# Patient Record
Sex: FEMALE | Race: OTHER | NOT HISPANIC OR LATINO
[De-identification: names, ages, dates, MRNs, and addresses within clinical notes are randomized per-mention and may not be internally consistent; named-entity substitution may affect disease eponyms.]

---
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
## 2022-01-12 ENCOUNTER — APPOINTMENT (OUTPATIENT)
Dept: PEDIATRIC HEMATOLOGY/ONCOLOGY | Facility: CLINIC | Age: 22
End: 2022-01-12
Payer: MEDICAID

## 2022-01-12 ENCOUNTER — LABORATORY RESULT (OUTPATIENT)
Age: 22
End: 2022-01-12

## 2022-01-12 ENCOUNTER — OUTPATIENT (OUTPATIENT)
Dept: OUTPATIENT SERVICES | Facility: HOSPITAL | Age: 22
LOS: 1 days | Discharge: HOME | End: 2022-01-12

## 2022-01-12 ENCOUNTER — APPOINTMENT (OUTPATIENT)
Dept: PEDIATRIC HEMATOLOGY/ONCOLOGY | Facility: CLINIC | Age: 22
End: 2022-01-12

## 2022-01-12 VITALS
HEIGHT: 70 IN | SYSTOLIC BLOOD PRESSURE: 121 MMHG | HEART RATE: 69 BPM | WEIGHT: 250 LBS | TEMPERATURE: 97 F | BODY MASS INDEX: 35.79 KG/M2 | DIASTOLIC BLOOD PRESSURE: 64 MMHG | OXYGEN SATURATION: 96 %

## 2022-01-12 LAB
HCT VFR BLD CALC: 40.5 %
HGB BLD-MCNC: 13.8 G/DL
MCHC RBC-ENTMCNC: 27.9 PG
MCHC RBC-ENTMCNC: 34.1 G/DL
MCV RBC AUTO: 82 FL
PLATELET # BLD AUTO: 240 K/UL
PMV BLD: 10 FL
RBC # BLD: 4.94 M/UL
RBC # FLD: 12.9 %
WBC # FLD AUTO: 7.71 K/UL

## 2022-01-12 PROCEDURE — 99214 OFFICE O/P EST MOD 30 MIN: CPT

## 2022-01-12 RX ORDER — INFLUENZA VIRUS VACCINE 15; 15; 15; 15 UG/.5ML; UG/.5ML; UG/.5ML; UG/.5ML
0.5 SUSPENSION INTRAMUSCULAR ONCE
Refills: 0 | Status: COMPLETED | OUTPATIENT
Start: 2022-01-12 | End: 2022-01-12

## 2022-01-12 RX ADMIN — INFLUENZA VIRUS VACCINE 0.5 MILLILITER(S): 15; 15; 15; 15 SUSPENSION INTRAMUSCULAR at 11:33

## 2022-01-13 LAB
ALBUMIN SERPL ELPH-MCNC: 4.9 G/DL
ALP BLD-CCNC: 63 U/L
ALT SERPL-CCNC: 19 U/L
ANION GAP SERPL CALC-SCNC: 16 MMOL/L
AST SERPL-CCNC: 19 U/L
BILIRUB SERPL-MCNC: 0.3 MG/DL
BUN SERPL-MCNC: 15 MG/DL
CALCIUM SERPL-MCNC: 10.1 MG/DL
CHLORIDE SERPL-SCNC: 103 MMOL/L
CO2 SERPL-SCNC: 22 MMOL/L
CREAT SERPL-MCNC: 0.9 MG/DL
ERYTHROCYTE [SEDIMENTATION RATE] IN BLOOD BY WESTERGREN METHOD: 30 MM/HR
GLUCOSE SERPL-MCNC: 98 MG/DL
LDH SERPL-CCNC: 167
POTASSIUM SERPL-SCNC: 4.5 MMOL/L
PROT SERPL-MCNC: 7.9 G/DL
SODIUM SERPL-SCNC: 141 MMOL/L

## 2022-01-14 NOTE — HISTORY OF PRESENT ILLNESS
[de-identified] : 20 yo with h/o  B cell lymphoma, ~4 years off tx.  \par \par No recent fevers.  Recent mild rhinorrhea, no cough.  Rapid covid test neg.\par no SOB, no chest pain, no resp distress, no orthopnea\par no recent adenopathy\par no recent boils/skin infections

## 2022-01-18 DIAGNOSIS — C85.13 UNSPECIFIED B-CELL LYMPHOMA, INTRA-ABDOMINAL LYMPH NODES: ICD-10-CM

## 2022-01-18 DIAGNOSIS — Z08 ENCOUNTER FOR FOLLOW-UP EXAMINATION AFTER COMPLETED TREATMENT FOR MALIGNANT NEOPLASM: ICD-10-CM

## 2022-01-22 ENCOUNTER — OUTPATIENT (OUTPATIENT)
Dept: OUTPATIENT SERVICES | Facility: HOSPITAL | Age: 22
LOS: 1 days | Discharge: HOME | End: 2022-01-22
Payer: MEDICAID

## 2022-01-22 DIAGNOSIS — C85.13 UNSPECIFIED B-CELL LYMPHOMA, INTRA-ABDOMINAL LYMPH NODES: ICD-10-CM

## 2022-01-22 DIAGNOSIS — Z08 ENCOUNTER FOR FOLLOW-UP EXAMINATION AFTER COMPLETED TREATMENT FOR MALIGNANT NEOPLASM: ICD-10-CM

## 2022-01-22 PROCEDURE — 71046 X-RAY EXAM CHEST 2 VIEWS: CPT | Mod: 26

## 2022-01-22 PROCEDURE — 76700 US EXAM ABDOM COMPLETE: CPT | Mod: 26

## 2022-05-16 ENCOUNTER — APPOINTMENT (OUTPATIENT)
Dept: PEDIATRIC HEMATOLOGY/ONCOLOGY | Facility: CLINIC | Age: 22
End: 2022-05-16

## 2022-08-31 ENCOUNTER — APPOINTMENT (OUTPATIENT)
Dept: PEDIATRIC HEMATOLOGY/ONCOLOGY | Facility: CLINIC | Age: 22
End: 2022-08-31

## 2022-08-31 ENCOUNTER — LABORATORY RESULT (OUTPATIENT)
Age: 22
End: 2022-08-31

## 2022-08-31 ENCOUNTER — OUTPATIENT (OUTPATIENT)
Dept: OUTPATIENT SERVICES | Facility: HOSPITAL | Age: 22
LOS: 1 days | Discharge: HOME | End: 2022-08-31

## 2022-08-31 VITALS
TEMPERATURE: 98.4 F | DIASTOLIC BLOOD PRESSURE: 56 MMHG | HEART RATE: 68 BPM | SYSTOLIC BLOOD PRESSURE: 115 MMHG | RESPIRATION RATE: 20 BRPM | WEIGHT: 239 LBS

## 2022-08-31 PROCEDURE — 99214 OFFICE O/P EST MOD 30 MIN: CPT

## 2022-09-01 NOTE — HISTORY OF PRESENT ILLNESS
[No Feeding Issues] : no feeding issues at this time [de-identified] : 20 y/o female with h/o B cell Lymphoma now off treatment 4.5 years seen in clinic today for scheduled follow up visit.  Patient reports that she has been feeling well.   Denies recent fever or infections.  Denies abdominal pain, vomiting or diarrhea.  Denies night sweats.   No significant changes in weight.  No reports of bleeding or bruising.  Eating well and with good energy level.   States that she continues to have boils to groin/thigh area that become inflamed at times.   Reports inflammation had worsened last week which lasted a few days and then symptoms had resolved; she did shave which exacerbated the lesions.\par regular menses, no heavy bleeding

## 2022-09-01 NOTE — END OF VISIT
[FreeTextEntry3] : 20 yo with h/o NHL off tx 5 years here for f/u.  Boils to inner thighs/groin.  Recommend warm compresses, can try mupirocin to boil prn, f/u with ID;   f/u clinic 1 month for flu vaccine or sooner with any concerns

## 2022-09-01 NOTE — REASON FOR VISIT
[Follow-Up Visit] : a follow-up visit for [Mother] : mother [Patient] : patient [FreeTextEntry2] : h/o B cell lymphoma

## 2022-09-01 NOTE — PHYSICAL EXAM
[Normal] : affect appropriate [de-identified] : boil noted to left inner thight- tender to touch, no drainage noted;

## 2022-09-02 LAB
ALBUMIN SERPL ELPH-MCNC: 4.8 G/DL
ALP BLD-CCNC: 58 U/L
ALT SERPL-CCNC: 8 U/L
ANION GAP SERPL CALC-SCNC: 10 MMOL/L
AST SERPL-CCNC: 10 U/L
BILIRUB SERPL-MCNC: 0.5 MG/DL
BUN SERPL-MCNC: 9 MG/DL
CALCIUM SERPL-MCNC: 9.5 MG/DL
CHLORIDE SERPL-SCNC: 104 MMOL/L
CO2 SERPL-SCNC: 26 MMOL/L
CREAT SERPL-MCNC: 0.7 MG/DL
EGFR: 128 ML/MIN/1.73M2
ERYTHROCYTE [SEDIMENTATION RATE] IN BLOOD BY WESTERGREN METHOD: 16 MM/HR
GLUCOSE SERPL-MCNC: 86 MG/DL
HCT VFR BLD CALC: 38.6 %
HGB BLD-MCNC: 12.9 G/DL
LDH SERPL-CCNC: 138
MCHC RBC-ENTMCNC: 27.6 PG
MCHC RBC-ENTMCNC: 33.4 G/DL
MCV RBC AUTO: 82.5 FL
PLATELET # BLD AUTO: 206 K/UL
PMV BLD: 10.4 FL
POTASSIUM SERPL-SCNC: 4.3 MMOL/L
PROT SERPL-MCNC: 7.1 G/DL
RBC # BLD: 4.68 M/UL
RBC # FLD: 12.8 %
SODIUM SERPL-SCNC: 140 MMOL/L
WBC # FLD AUTO: 6.14 K/UL

## 2022-09-06 DIAGNOSIS — L02.93 CARBUNCLE, UNSPECIFIED: ICD-10-CM

## 2022-09-06 DIAGNOSIS — C85.13 UNSPECIFIED B-CELL LYMPHOMA, INTRA-ABDOMINAL LYMPH NODES: ICD-10-CM

## 2022-09-06 DIAGNOSIS — Z08 ENCOUNTER FOR FOLLOW-UP EXAMINATION AFTER COMPLETED TREATMENT FOR MALIGNANT NEOPLASM: ICD-10-CM

## 2022-09-30 ENCOUNTER — APPOINTMENT (OUTPATIENT)
Dept: PEDIATRIC HEMATOLOGY/ONCOLOGY | Facility: CLINIC | Age: 22
End: 2022-09-30

## 2022-10-07 ENCOUNTER — APPOINTMENT (OUTPATIENT)
Dept: PEDIATRIC HEMATOLOGY/ONCOLOGY | Facility: CLINIC | Age: 22
End: 2022-10-07

## 2022-12-14 ENCOUNTER — APPOINTMENT (OUTPATIENT)
Dept: PEDIATRIC HEMATOLOGY/ONCOLOGY | Facility: CLINIC | Age: 22
End: 2022-12-14

## 2022-12-14 VITALS
OXYGEN SATURATION: 98 % | WEIGHT: 238 LBS | BODY MASS INDEX: 34.07 KG/M2 | TEMPERATURE: 98.6 F | DIASTOLIC BLOOD PRESSURE: 64 MMHG | HEART RATE: 63 BPM | RESPIRATION RATE: 20 BRPM | SYSTOLIC BLOOD PRESSURE: 131 MMHG | HEIGHT: 70 IN

## 2022-12-14 DIAGNOSIS — L03.90 CELLULITIS, UNSPECIFIED: ICD-10-CM

## 2022-12-14 PROCEDURE — 99214 OFFICE O/P EST MOD 30 MIN: CPT

## 2022-12-14 RX ORDER — CLINDAMYCIN HYDROCHLORIDE 300 MG/1
300 CAPSULE ORAL EVERY 6 HOURS
Qty: 40 | Refills: 0 | Status: ACTIVE | COMMUNITY
Start: 2022-12-14 | End: 1900-01-01

## 2022-12-19 ENCOUNTER — APPOINTMENT (OUTPATIENT)
Dept: PEDIATRIC HEMATOLOGY/ONCOLOGY | Facility: CLINIC | Age: 22
End: 2022-12-19
Payer: MEDICAID

## 2022-12-19 ENCOUNTER — OUTPATIENT (OUTPATIENT)
Dept: OUTPATIENT SERVICES | Facility: HOSPITAL | Age: 22
LOS: 1 days | End: 2022-12-19

## 2022-12-19 VITALS
WEIGHT: 239.42 LBS | HEART RATE: 72 BPM | SYSTOLIC BLOOD PRESSURE: 119 MMHG | RESPIRATION RATE: 20 BRPM | DIASTOLIC BLOOD PRESSURE: 66 MMHG | TEMPERATURE: 98.4 F

## 2022-12-19 DIAGNOSIS — L02.93 CARBUNCLE, UNSPECIFIED: ICD-10-CM

## 2022-12-19 DIAGNOSIS — B34.9 VIRAL INFECTION, UNSPECIFIED: ICD-10-CM

## 2022-12-19 PROCEDURE — 99213 OFFICE O/P EST LOW 20 MIN: CPT

## 2022-12-21 PROBLEM — L03.90 CELLULITIS: Status: ACTIVE | Noted: 2022-12-19

## 2022-12-22 NOTE — END OF VISIT
[FreeTextEntry3] : pt seen and examined.  cellulitis left breast. tx with clinda.  recommended f/u with ID due to h/o recurrent boils.  f/u for reassessment- call sooner if worsening symptoms.   Is This A New Presentation, Or A Follow-Up?: Skin Lesions How Severe Is Your Skin Lesion?: moderate Have Your Skin Lesions Been Treated?: not been treated

## 2022-12-22 NOTE — HISTORY OF PRESENT ILLNESS
[de-identified] : 23 y/o female with h/o B cell Lymphoma,completed treatment almost 6 years ago with c/o redness and irritation to skin of left breast x1 day.  Patient reports that she noticed irritation and redness to affected area worsen over the past 24 hours.   Denies fever, pain or drainage from site.  States that she does exercise daily, walks for long periods of time and notices sweat accumulating around affected area, thought possibly that undergarments may have irritated area.

## 2022-12-22 NOTE — REASON FOR VISIT
[Follow-Up Visit] : a follow-up visit for [Mother] : mother [Patient] : patient [FreeTextEntry2] : B cell Lymphoma

## 2022-12-22 NOTE — PHYSICAL EXAM
[Normal] : full range of motion and no deformities appreciated, no masses and normal strength in all extremities [de-identified] : small indurated area ~1cm noted  left breast with erythema noted around site, soft, nontender and  no drainage noted

## 2022-12-23 DIAGNOSIS — L03.90 CELLULITIS, UNSPECIFIED: ICD-10-CM

## 2022-12-28 PROBLEM — L02.93 RECURRENT BOILS: Status: ACTIVE | Noted: 2020-09-09

## 2022-12-28 PROBLEM — B34.9 VIRAL SYNDROME: Status: ACTIVE | Noted: 2017-12-13

## 2022-12-28 NOTE — HISTORY OF PRESENT ILLNESS
[No Feeding Issues] : no feeding issues at this time [de-identified] : This is a scheduled follow up visit for this 23 y/o female with h/o B cell Lymphoma off treatment almost 6 years presents to clinic today for scheduled follow up visit.  Patient last seen in clinic on 12/14 due to cellulitis noted to left breast - patient was started on course of Clindamycin.  Reports improvement in symptoms.  Denies drainage from site.  Denies fever or pain.

## 2022-12-28 NOTE — PHYSICAL EXAM
[Normal] : full range of motion and no deformities appreciated, no masses and normal strength in all extremities [de-identified] : cellulitis to left breast - no induration, pain or drainage noted at site

## 2022-12-28 NOTE — REVIEW OF SYSTEMS
[Fever] : no fever [Fatigue] : no fatigue [Rash] : no rash [Petechiae] : no petechiae [Ecchymoses] : no ecchymoses [Nasal Discharge] : no nasal discharge [Sore Throat] : no sore throat [Pallor] : no pallor [Bleeding] : no bleeding [Bruising] : no bruising [Adenopathy] : no adenopathy [Anemia] : no anemia [Frequent Infections] : no frequent infections [Dyspnea] : no dyspnea [Cough] : no cough [Wheezing] : no wheezing [Stridor] : no stridor [Murmur] : no murmur [Chest Pain] : no chest pain [Abdominal Pain] : no abdominal pain [Nausea] : no nausea [Headache] : no headache [Yost] : not yost [Irritable] : not irritable

## 2022-12-28 NOTE — END OF VISIT
[FreeTextEntry3] : pt seen, agree with plan above. complete course of clindamycin. f/u ID for recurrent skin infections/boils.

## 2023-01-03 DIAGNOSIS — L02.93 CARBUNCLE, UNSPECIFIED: ICD-10-CM

## 2023-01-03 DIAGNOSIS — B34.9 VIRAL INFECTION, UNSPECIFIED: ICD-10-CM

## 2023-01-03 DIAGNOSIS — Z08 ENCOUNTER FOR FOLLOW-UP EXAMINATION AFTER COMPLETED TREATMENT FOR MALIGNANT NEOPLASM: ICD-10-CM

## 2023-10-30 ENCOUNTER — OUTPATIENT (OUTPATIENT)
Dept: OUTPATIENT SERVICES | Facility: HOSPITAL | Age: 23
LOS: 1 days | End: 2023-10-30
Payer: MEDICAID

## 2023-10-30 ENCOUNTER — LABORATORY RESULT (OUTPATIENT)
Age: 23
End: 2023-10-30

## 2023-10-30 ENCOUNTER — APPOINTMENT (OUTPATIENT)
Dept: PEDIATRIC HEMATOLOGY/ONCOLOGY | Facility: CLINIC | Age: 23
End: 2023-10-30
Payer: MEDICAID

## 2023-10-30 VITALS
HEART RATE: 61 BPM | DIASTOLIC BLOOD PRESSURE: 62 MMHG | TEMPERATURE: 98 F | BODY MASS INDEX: 37.43 KG/M2 | OXYGEN SATURATION: 98 % | WEIGHT: 261.47 LBS | SYSTOLIC BLOOD PRESSURE: 134 MMHG | RESPIRATION RATE: 20 BRPM | HEIGHT: 70.08 IN

## 2023-10-30 DIAGNOSIS — C85.13 UNSPECIFIED B-CELL LYMPHOMA, INTRA-ABDOMINAL LYMPH NODES: ICD-10-CM

## 2023-10-30 DIAGNOSIS — Z28.21 IMMUNIZATION NOT CARRIED OUT BECAUSE OF PATIENT REFUSAL: ICD-10-CM

## 2023-10-30 DIAGNOSIS — Z71.85 ENCOUNTER FOR IMMUNIZATION SAFETY COUNSELING: ICD-10-CM

## 2023-10-30 DIAGNOSIS — Z08 ENCOUNTER FOR FOLLOW-UP EXAMINATION AFTER COMPLETED TREATMENT FOR MALIGNANT NEOPLASM: ICD-10-CM

## 2023-10-30 PROCEDURE — 85027 COMPLETE CBC AUTOMATED: CPT

## 2023-10-30 PROCEDURE — 99214 OFFICE O/P EST MOD 30 MIN: CPT

## 2023-10-30 PROCEDURE — 80053 COMPREHEN METABOLIC PANEL: CPT

## 2023-10-30 PROCEDURE — 85652 RBC SED RATE AUTOMATED: CPT

## 2023-10-30 PROCEDURE — 83615 LACTATE (LD) (LDH) ENZYME: CPT

## 2023-10-31 DIAGNOSIS — C85.13 UNSPECIFIED B-CELL LYMPHOMA, INTRA-ABDOMINAL LYMPH NODES: ICD-10-CM

## 2023-10-31 PROBLEM — Z71.85 VACCINE COUNSELING: Status: ACTIVE | Noted: 2023-10-31

## 2023-10-31 PROBLEM — Z28.21 IMMUNIZATION REFUSED: Status: ACTIVE | Noted: 2023-10-31

## 2023-10-31 PROBLEM — Z08 ENCOUNTER FOR ROUTINE CANCER FOLLOW-UP: Status: ACTIVE | Noted: 2021-07-14

## 2023-10-31 LAB
ALBUMIN SERPL ELPH-MCNC: 4.9 G/DL
ALP BLD-CCNC: 65 U/L
ALT SERPL-CCNC: 13 U/L
ANION GAP SERPL CALC-SCNC: 12 MMOL/L
AST SERPL-CCNC: 12 U/L
BILIRUB SERPL-MCNC: 0.3 MG/DL
BUN SERPL-MCNC: 12 MG/DL
CALCIUM SERPL-MCNC: 9.5 MG/DL
CHLORIDE SERPL-SCNC: 102 MMOL/L
CO2 SERPL-SCNC: 24 MMOL/L
CREAT SERPL-MCNC: 0.8 MG/DL
EGFR: 108 ML/MIN/1.73M2
GLUCOSE SERPL-MCNC: 94 MG/DL
HCT VFR BLD CALC: 39.7 %
HGB BLD-MCNC: 13.2 G/DL
LDH SERPL-CCNC: 192
MCHC RBC-ENTMCNC: 27.5 PG
MCHC RBC-ENTMCNC: 33.2 G/DL
MCV RBC AUTO: 82.7 FL
PLATELET # BLD AUTO: 227 K/UL
PMV BLD: 10.7 FL
POTASSIUM SERPL-SCNC: 4.2 MMOL/L
PROT SERPL-MCNC: 7.6 G/DL
RBC # BLD: 4.8 M/UL
RBC # FLD: 13.5 %
SODIUM SERPL-SCNC: 138 MMOL/L
WBC # FLD AUTO: 8.31 K/UL

## 2023-11-01 LAB — ERYTHROCYTE [SEDIMENTATION RATE] IN BLOOD BY WESTERGREN METHOD: 29 MM/HR

## 2024-12-30 ENCOUNTER — LABORATORY RESULT (OUTPATIENT)
Age: 24
End: 2024-12-30

## 2024-12-30 ENCOUNTER — OUTPATIENT (OUTPATIENT)
Dept: OUTPATIENT SERVICES | Facility: HOSPITAL | Age: 24
LOS: 1 days | End: 2024-12-30
Payer: MEDICAID

## 2024-12-30 ENCOUNTER — APPOINTMENT (OUTPATIENT)
Age: 24
End: 2024-12-30
Payer: MEDICAID

## 2024-12-30 VITALS
TEMPERATURE: 98 F | RESPIRATION RATE: 18 BRPM | OXYGEN SATURATION: 97 % | DIASTOLIC BLOOD PRESSURE: 70 MMHG | HEART RATE: 83 BPM | BODY MASS INDEX: 35.05 KG/M2 | HEIGHT: 69.69 IN | WEIGHT: 242.07 LBS | SYSTOLIC BLOOD PRESSURE: 113 MMHG

## 2024-12-30 DIAGNOSIS — C85.13 UNSPECIFIED B-CELL LYMPHOMA, INTRA-ABDOMINAL LYMPH NODES: ICD-10-CM

## 2024-12-30 DIAGNOSIS — Z08 ENCOUNTER FOR FOLLOW-UP EXAMINATION AFTER COMPLETED TREATMENT FOR MALIGNANT NEOPLASM: ICD-10-CM

## 2024-12-30 DIAGNOSIS — L02.92 FURUNCLE, UNSPECIFIED: ICD-10-CM

## 2024-12-30 LAB
ALBUMIN SERPL ELPH-MCNC: 4.8 G/DL
ALP BLD-CCNC: 54 U/L
ALT SERPL-CCNC: 12 U/L
ANION GAP SERPL CALC-SCNC: 10 MMOL/L
AST SERPL-CCNC: 11 U/L
BILIRUB SERPL-MCNC: 0.4 MG/DL
BUN SERPL-MCNC: 7 MG/DL
CALCIUM SERPL-MCNC: 9.6 MG/DL
CHLORIDE SERPL-SCNC: 105 MMOL/L
CO2 SERPL-SCNC: 26 MMOL/L
CREAT SERPL-MCNC: 0.7 MG/DL
EGFR: 125 ML/MIN/1.73M2
ERYTHROCYTE [SEDIMENTATION RATE] IN BLOOD BY WESTERGREN METHOD: 14 MM/HR
GLUCOSE SERPL-MCNC: 89 MG/DL
HCT VFR BLD CALC: 36.2 %
HGB BLD-MCNC: 12.3 G/DL
LDH SERPL-CCNC: 134
MCHC RBC-ENTMCNC: 27.4 PG
MCHC RBC-ENTMCNC: 34 G/DL
MCV RBC AUTO: 80.6 FL
PLATELET # BLD AUTO: 215 K/UL
PMV BLD: 10.8 FL
POTASSIUM SERPL-SCNC: 4.2 MMOL/L
PROT SERPL-MCNC: 7.1 G/DL
RBC # BLD: 4.49 M/UL
RBC # FLD: 13.4 %
SODIUM SERPL-SCNC: 141 MMOL/L
T4 FREE SERPL-MCNC: 1.2 NG/DL
TSH SERPL-ACNC: 1.21 UIU/ML
WBC # FLD AUTO: 6.93 K/UL

## 2024-12-30 PROCEDURE — 83615 LACTATE (LD) (LDH) ENZYME: CPT

## 2024-12-30 PROCEDURE — 84443 ASSAY THYROID STIM HORMONE: CPT

## 2024-12-30 PROCEDURE — 99214 OFFICE O/P EST MOD 30 MIN: CPT

## 2024-12-30 PROCEDURE — 85027 COMPLETE CBC AUTOMATED: CPT

## 2024-12-30 PROCEDURE — 80053 COMPREHEN METABOLIC PANEL: CPT

## 2024-12-30 PROCEDURE — 82784 ASSAY IGA/IGD/IGG/IGM EACH: CPT

## 2024-12-30 PROCEDURE — 84439 ASSAY OF FREE THYROXINE: CPT

## 2024-12-30 PROCEDURE — 85652 RBC SED RATE AUTOMATED: CPT

## 2024-12-31 DIAGNOSIS — C85.13 UNSPECIFIED B-CELL LYMPHOMA, INTRA-ABDOMINAL LYMPH NODES: ICD-10-CM

## 2024-12-31 LAB
IGA SER QL IEP: 211 MG/DL
IGG SER QL IEP: 1004 MG/DL
IGM SER QL IEP: 112 MG/DL

## 2025-01-06 ENCOUNTER — NON-APPOINTMENT (OUTPATIENT)
Age: 25
End: 2025-01-06

## 2025-06-13 ENCOUNTER — APPOINTMENT (OUTPATIENT)
Age: 25
End: 2025-06-13
Payer: MEDICAID

## 2025-06-13 ENCOUNTER — OUTPATIENT (OUTPATIENT)
Dept: OUTPATIENT SERVICES | Facility: HOSPITAL | Age: 25
LOS: 1 days | End: 2025-06-13
Payer: MEDICAID

## 2025-06-13 VITALS
DIASTOLIC BLOOD PRESSURE: 69 MMHG | BODY MASS INDEX: 34.41 KG/M2 | HEIGHT: 69.69 IN | RESPIRATION RATE: 18 BRPM | OXYGEN SATURATION: 98 % | HEART RATE: 68 BPM | SYSTOLIC BLOOD PRESSURE: 112 MMHG | WEIGHT: 237.66 LBS | TEMPERATURE: 98 F

## 2025-06-13 DIAGNOSIS — C85.13 UNSPECIFIED B-CELL LYMPHOMA, INTRA-ABDOMINAL LYMPH NODES: ICD-10-CM

## 2025-06-13 LAB
ALBUMIN SERPL ELPH-MCNC: 4.6 G/DL
ALP BLD-CCNC: 62 U/L
ALT SERPL-CCNC: 9 U/L
ANION GAP SERPL CALC-SCNC: 12 MMOL/L
AST SERPL-CCNC: 12 U/L
AUTO BASOPHILS #: 0.03 K/UL
AUTO BASOPHILS %: 0.5 %
AUTO EOSINOPHILS #: 0.1 K/UL
AUTO EOSINOPHILS %: 1.5 %
AUTO IMMATURE GRANULOCYTES #: 0.01 K/UL
AUTO LYMPHOCYTES #: 2.73 K/UL
AUTO LYMPHOCYTES %: 42.2 %
AUTO MONOCYTES #: 0.6 K/UL
AUTO MONOCYTES %: 9.3 %
AUTO NEUTROPHILS #: 3 K/UL
AUTO NEUTROPHILS %: 46.3 %
AUTO NRBC #: 0 K/UL
BILIRUB SERPL-MCNC: 0.4 MG/DL
BUN SERPL-MCNC: 17 MG/DL
CALCIUM SERPL-MCNC: 9.3 MG/DL
CHLORIDE SERPL-SCNC: 105 MMOL/L
CO2 SERPL-SCNC: 22 MMOL/L
CREAT SERPL-MCNC: 0.8 MG/DL
EGFRCR SERPLBLD CKD-EPI 2021: 107 ML/MIN/1.73M2
ERYTHROCYTE [SEDIMENTATION RATE] IN BLOOD BY WESTERGREN METHOD: 10 MM/HR
GLUCOSE SERPL-MCNC: 91 MG/DL
HCT VFR BLD CALC: 36.3 %
HGB BLD-MCNC: 12 G/DL
IMM GRANULOCYTES NFR BLD AUTO: 0.2 %
IMMATURE RETICULOCYTE FRACTION %: 5.9 %
LDH SERPL-CCNC: 173
MAN DIFF?: NORMAL
MCHC RBC-ENTMCNC: 27.4 PG
MCHC RBC-ENTMCNC: 33.1 G/DL
MCV RBC AUTO: 82.9 FL
PLATELET # BLD AUTO: 204 K/UL
PMV BLD AUTO: 0 /100 WBCS
PMV BLD: 10.4 FL
POTASSIUM SERPL-SCNC: 4.6 MMOL/L
PROT SERPL-MCNC: 7.1 G/DL
RBC # BLD: 4.38 M/UL
RBC # BLD: 4.38 M/UL
RBC # FLD: 13.2 %
RETICS # AUTO: 1.5 %
RETICS AGGREG/RBC NFR: 63.9 K/UL
RETICULOCYTE HEMOGLOBIN EQUIVALENT: 30.9 PG
SODIUM SERPL-SCNC: 139 MMOL/L
WBC # FLD AUTO: 6.47 K/UL

## 2025-06-13 PROCEDURE — 99214 OFFICE O/P EST MOD 30 MIN: CPT

## 2025-06-13 PROCEDURE — 82784 ASSAY IGA/IGD/IGG/IGM EACH: CPT

## 2025-06-13 PROCEDURE — 82728 ASSAY OF FERRITIN: CPT

## 2025-06-13 PROCEDURE — 85652 RBC SED RATE AUTOMATED: CPT

## 2025-06-13 PROCEDURE — 83615 LACTATE (LD) (LDH) ENZYME: CPT

## 2025-06-13 PROCEDURE — 80053 COMPREHEN METABOLIC PANEL: CPT

## 2025-06-13 PROCEDURE — 85025 COMPLETE CBC W/AUTO DIFF WBC: CPT

## 2025-06-13 PROCEDURE — 85045 AUTOMATED RETICULOCYTE COUNT: CPT

## 2025-06-14 DIAGNOSIS — C85.13 UNSPECIFIED B-CELL LYMPHOMA, INTRA-ABDOMINAL LYMPH NODES: ICD-10-CM

## 2025-06-14 LAB
FERRITIN SERPL-MCNC: 91 NG/ML
IGG SERPL-MCNC: 951 MG/DL

## 2025-06-19 ENCOUNTER — OUTPATIENT (OUTPATIENT)
Dept: OUTPATIENT SERVICES | Facility: HOSPITAL | Age: 25
LOS: 1 days | End: 2025-06-19
Payer: MEDICAID

## 2025-06-19 ENCOUNTER — RESULT REVIEW (OUTPATIENT)
Age: 25
End: 2025-06-19

## 2025-06-19 DIAGNOSIS — Z00.8 ENCOUNTER FOR OTHER GENERAL EXAMINATION: ICD-10-CM

## 2025-06-19 DIAGNOSIS — R59.9 ENLARGED LYMPH NODES, UNSPECIFIED: ICD-10-CM

## 2025-06-19 PROCEDURE — 76536 US EXAM OF HEAD AND NECK: CPT

## 2025-06-19 PROCEDURE — 76536 US EXAM OF HEAD AND NECK: CPT | Mod: 26

## 2025-06-20 DIAGNOSIS — R59.9 ENLARGED LYMPH NODES, UNSPECIFIED: ICD-10-CM

## 2025-06-24 ENCOUNTER — APPOINTMENT (OUTPATIENT)
Age: 25
End: 2025-06-24

## 2025-06-26 ENCOUNTER — NON-APPOINTMENT (OUTPATIENT)
Age: 25
End: 2025-06-26